# Patient Record
Sex: FEMALE | Race: WHITE | Employment: UNEMPLOYED | URBAN - METROPOLITAN AREA
[De-identification: names, ages, dates, MRNs, and addresses within clinical notes are randomized per-mention and may not be internally consistent; named-entity substitution may affect disease eponyms.]

---

## 2023-02-21 ENCOUNTER — ANESTHESIA EVENT (OUTPATIENT)
Facility: HOSPITAL | Age: 53
End: 2023-02-21
Payer: COMMERCIAL

## 2023-02-23 ENCOUNTER — HOSPITAL ENCOUNTER (OUTPATIENT)
Facility: HOSPITAL | Age: 53
Setting detail: OUTPATIENT SURGERY
Discharge: HOME | End: 2023-02-23
Attending: STUDENT IN AN ORGANIZED HEALTH CARE EDUCATION/TRAINING PROGRAM | Admitting: STUDENT IN AN ORGANIZED HEALTH CARE EDUCATION/TRAINING PROGRAM
Payer: COMMERCIAL

## 2023-02-23 ENCOUNTER — ANESTHESIA (OUTPATIENT)
Facility: HOSPITAL | Age: 53
End: 2023-02-23
Payer: COMMERCIAL

## 2023-02-23 VITALS
TEMPERATURE: 97.8 F | HEIGHT: 68 IN | DIASTOLIC BLOOD PRESSURE: 61 MMHG | HEART RATE: 67 BPM | RESPIRATION RATE: 16 BRPM | BODY MASS INDEX: 17.52 KG/M2 | WEIGHT: 115.6 LBS | OXYGEN SATURATION: 100 % | SYSTOLIC BLOOD PRESSURE: 138 MMHG

## 2023-02-23 LAB
ABO + RH BLD: NORMAL
BLOOD GROUP ANTIBODIES SERPL: NORMAL
SPECIMEN EXP DATE BLD: NORMAL

## 2023-02-23 PROCEDURE — 2720000010 HC SURG SUPPLY STERILE: Performed by: STUDENT IN AN ORGANIZED HEALTH CARE EDUCATION/TRAINING PROGRAM

## 2023-02-23 PROCEDURE — 3600000002 HC SURGERY LEVEL 2 BASE: Performed by: STUDENT IN AN ORGANIZED HEALTH CARE EDUCATION/TRAINING PROGRAM

## 2023-02-23 PROCEDURE — 2580000003 HC RX 258: Performed by: STUDENT IN AN ORGANIZED HEALTH CARE EDUCATION/TRAINING PROGRAM

## 2023-02-23 PROCEDURE — 3700000001 HC ADD 15 MINUTES (ANESTHESIA): Performed by: STUDENT IN AN ORGANIZED HEALTH CARE EDUCATION/TRAINING PROGRAM

## 2023-02-23 PROCEDURE — 6360000002 HC RX W HCPCS: Performed by: NURSE ANESTHETIST, CERTIFIED REGISTERED

## 2023-02-23 PROCEDURE — 2500000003 HC RX 250 WO HCPCS: Performed by: NURSE ANESTHETIST, CERTIFIED REGISTERED

## 2023-02-23 PROCEDURE — 36415 COLL VENOUS BLD VENIPUNCTURE: CPT

## 2023-02-23 PROCEDURE — 88305 TISSUE EXAM BY PATHOLOGIST: CPT

## 2023-02-23 PROCEDURE — 7100000010 HC PHASE II RECOVERY - FIRST 15 MIN: Performed by: STUDENT IN AN ORGANIZED HEALTH CARE EDUCATION/TRAINING PROGRAM

## 2023-02-23 PROCEDURE — 7100000011 HC PHASE II RECOVERY - ADDTL 15 MIN: Performed by: STUDENT IN AN ORGANIZED HEALTH CARE EDUCATION/TRAINING PROGRAM

## 2023-02-23 PROCEDURE — 86900 BLOOD TYPING SEROLOGIC ABO: CPT

## 2023-02-23 PROCEDURE — 7100000000 HC PACU RECOVERY - FIRST 15 MIN: Performed by: STUDENT IN AN ORGANIZED HEALTH CARE EDUCATION/TRAINING PROGRAM

## 2023-02-23 PROCEDURE — 6370000000 HC RX 637 (ALT 250 FOR IP): Performed by: STUDENT IN AN ORGANIZED HEALTH CARE EDUCATION/TRAINING PROGRAM

## 2023-02-23 PROCEDURE — 2709999900 HC NON-CHARGEABLE SUPPLY: Performed by: STUDENT IN AN ORGANIZED HEALTH CARE EDUCATION/TRAINING PROGRAM

## 2023-02-23 PROCEDURE — 3600000012 HC SURGERY LEVEL 2 ADDTL 15MIN: Performed by: STUDENT IN AN ORGANIZED HEALTH CARE EDUCATION/TRAINING PROGRAM

## 2023-02-23 PROCEDURE — 3700000000 HC ANESTHESIA ATTENDED CARE: Performed by: STUDENT IN AN ORGANIZED HEALTH CARE EDUCATION/TRAINING PROGRAM

## 2023-02-23 PROCEDURE — 7100000001 HC PACU RECOVERY - ADDTL 15 MIN: Performed by: STUDENT IN AN ORGANIZED HEALTH CARE EDUCATION/TRAINING PROGRAM

## 2023-02-23 RX ORDER — SODIUM CHLORIDE 0.9 % (FLUSH) 0.9 %
5-40 SYRINGE (ML) INJECTION EVERY 12 HOURS SCHEDULED
Status: DISCONTINUED | OUTPATIENT
Start: 2023-02-23 | End: 2023-02-23 | Stop reason: HOSPADM

## 2023-02-23 RX ORDER — SODIUM CHLORIDE, SODIUM LACTATE, POTASSIUM CHLORIDE, CALCIUM CHLORIDE 600; 310; 30; 20 MG/100ML; MG/100ML; MG/100ML; MG/100ML
INJECTION, SOLUTION INTRAVENOUS CONTINUOUS
Status: DISCONTINUED | OUTPATIENT
Start: 2023-02-23 | End: 2023-02-23 | Stop reason: HOSPADM

## 2023-02-23 RX ORDER — LIDOCAINE HYDROCHLORIDE 20 MG/ML
INJECTION, SOLUTION EPIDURAL; INFILTRATION; INTRACAUDAL; PERINEURAL PRN
Status: DISCONTINUED | OUTPATIENT
Start: 2023-02-23 | End: 2023-02-23 | Stop reason: SDUPTHER

## 2023-02-23 RX ORDER — DEXAMETHASONE SODIUM PHOSPHATE 4 MG/ML
INJECTION, SOLUTION INTRA-ARTICULAR; INTRALESIONAL; INTRAMUSCULAR; INTRAVENOUS; SOFT TISSUE PRN
Status: DISCONTINUED | OUTPATIENT
Start: 2023-02-23 | End: 2023-02-23 | Stop reason: SDUPTHER

## 2023-02-23 RX ORDER — ONDANSETRON 2 MG/ML
4 INJECTION INTRAMUSCULAR; INTRAVENOUS
Status: DISCONTINUED | OUTPATIENT
Start: 2023-02-23 | End: 2023-02-23 | Stop reason: HOSPADM

## 2023-02-23 RX ORDER — HYDROMORPHONE HYDROCHLORIDE 1 MG/ML
0.25 INJECTION, SOLUTION INTRAMUSCULAR; INTRAVENOUS; SUBCUTANEOUS EVERY 5 MIN PRN
Status: DISCONTINUED | OUTPATIENT
Start: 2023-02-23 | End: 2023-02-23 | Stop reason: HOSPADM

## 2023-02-23 RX ORDER — SODIUM CHLORIDE 0.9 % (FLUSH) 0.9 %
5-40 SYRINGE (ML) INJECTION PRN
Status: DISCONTINUED | OUTPATIENT
Start: 2023-02-23 | End: 2023-02-23 | Stop reason: HOSPADM

## 2023-02-23 RX ORDER — FENTANYL CITRATE 50 UG/ML
INJECTION, SOLUTION INTRAMUSCULAR; INTRAVENOUS PRN
Status: DISCONTINUED | OUTPATIENT
Start: 2023-02-23 | End: 2023-02-23 | Stop reason: SDUPTHER

## 2023-02-23 RX ORDER — FENTANYL CITRATE 50 UG/ML
25 INJECTION, SOLUTION INTRAMUSCULAR; INTRAVENOUS EVERY 5 MIN PRN
Status: DISCONTINUED | OUTPATIENT
Start: 2023-02-23 | End: 2023-02-23 | Stop reason: HOSPADM

## 2023-02-23 RX ORDER — OXYCODONE HYDROCHLORIDE 5 MG/1
5 TABLET ORAL
Status: DISCONTINUED | OUTPATIENT
Start: 2023-02-23 | End: 2023-02-23 | Stop reason: HOSPADM

## 2023-02-23 RX ORDER — DIPHENHYDRAMINE HYDROCHLORIDE 50 MG/ML
12.5 INJECTION INTRAMUSCULAR; INTRAVENOUS
Status: DISCONTINUED | OUTPATIENT
Start: 2023-02-23 | End: 2023-02-23 | Stop reason: HOSPADM

## 2023-02-23 RX ORDER — SODIUM CHLORIDE 9 MG/ML
INJECTION, SOLUTION INTRAVENOUS PRN
Status: DISCONTINUED | OUTPATIENT
Start: 2023-02-23 | End: 2023-02-23 | Stop reason: HOSPADM

## 2023-02-23 RX ORDER — DROPERIDOL 2.5 MG/ML
0.62 INJECTION, SOLUTION INTRAMUSCULAR; INTRAVENOUS
Status: DISCONTINUED | OUTPATIENT
Start: 2023-02-23 | End: 2023-02-23 | Stop reason: HOSPADM

## 2023-02-23 RX ORDER — PROPOFOL 10 MG/ML
INJECTION, EMULSION INTRAVENOUS PRN
Status: DISCONTINUED | OUTPATIENT
Start: 2023-02-23 | End: 2023-02-23 | Stop reason: SDUPTHER

## 2023-02-23 RX ORDER — ONDANSETRON 2 MG/ML
INJECTION INTRAMUSCULAR; INTRAVENOUS PRN
Status: DISCONTINUED | OUTPATIENT
Start: 2023-02-23 | End: 2023-02-23 | Stop reason: SDUPTHER

## 2023-02-23 RX ORDER — IPRATROPIUM BROMIDE AND ALBUTEROL SULFATE 2.5; .5 MG/3ML; MG/3ML
1 SOLUTION RESPIRATORY (INHALATION)
Status: DISCONTINUED | OUTPATIENT
Start: 2023-02-23 | End: 2023-02-23 | Stop reason: HOSPADM

## 2023-02-23 RX ORDER — LABETALOL HYDROCHLORIDE 5 MG/ML
10 INJECTION, SOLUTION INTRAVENOUS
Status: DISCONTINUED | OUTPATIENT
Start: 2023-02-23 | End: 2023-02-23 | Stop reason: HOSPADM

## 2023-02-23 RX ORDER — MIDAZOLAM HYDROCHLORIDE 1 MG/ML
INJECTION INTRAMUSCULAR; INTRAVENOUS PRN
Status: DISCONTINUED | OUTPATIENT
Start: 2023-02-23 | End: 2023-02-23 | Stop reason: SDUPTHER

## 2023-02-23 RX ORDER — KETOROLAC TROMETHAMINE 30 MG/ML
INJECTION, SOLUTION INTRAMUSCULAR; INTRAVENOUS PRN
Status: DISCONTINUED | OUTPATIENT
Start: 2023-02-23 | End: 2023-02-23 | Stop reason: SDUPTHER

## 2023-02-23 RX ADMIN — FENTANYL CITRATE 25 MCG: 50 INJECTION, SOLUTION INTRAMUSCULAR; INTRAVENOUS at 14:29

## 2023-02-23 RX ADMIN — KETOROLAC TROMETHAMINE 30 MG: 30 INJECTION, SOLUTION INTRAMUSCULAR; INTRAVENOUS at 14:36

## 2023-02-23 RX ADMIN — FENTANYL CITRATE 25 MCG: 50 INJECTION, SOLUTION INTRAMUSCULAR; INTRAVENOUS at 14:14

## 2023-02-23 RX ADMIN — SODIUM CHLORIDE, SODIUM LACTATE, POTASSIUM CHLORIDE, AND CALCIUM CHLORIDE: 600; 310; 30; 20 INJECTION, SOLUTION INTRAVENOUS at 13:58

## 2023-02-23 RX ADMIN — FENTANYL CITRATE 25 MCG: 50 INJECTION, SOLUTION INTRAMUSCULAR; INTRAVENOUS at 14:16

## 2023-02-23 RX ADMIN — MIDAZOLAM 2 MG: 1 INJECTION INTRAMUSCULAR; INTRAVENOUS at 13:58

## 2023-02-23 RX ADMIN — PROPOFOL 150 MG: 10 INJECTION, EMULSION INTRAVENOUS at 14:05

## 2023-02-23 RX ADMIN — SODIUM CHLORIDE, SODIUM LACTATE, POTASSIUM CHLORIDE, AND CALCIUM CHLORIDE: 600; 310; 30; 20 INJECTION, SOLUTION INTRAVENOUS at 11:50

## 2023-02-23 RX ADMIN — LIDOCAINE HYDROCHLORIDE 100 MG: 20 INJECTION, SOLUTION EPIDURAL; INFILTRATION; INTRACAUDAL; PERINEURAL at 14:05

## 2023-02-23 RX ADMIN — DEXAMETHASONE SODIUM PHOSPHATE 4 MG: 4 INJECTION, SOLUTION INTRAMUSCULAR; INTRAVENOUS at 14:11

## 2023-02-23 RX ADMIN — ONDANSETRON HYDROCHLORIDE 4 MG: 2 INJECTION INTRAMUSCULAR; INTRAVENOUS at 14:11

## 2023-02-23 ASSESSMENT — PAIN - FUNCTIONAL ASSESSMENT: PAIN_FUNCTIONAL_ASSESSMENT: 0-10

## 2023-02-23 NOTE — PROGRESS NOTES
TRANSFER - OUT REPORT:    Verbal report given to Galo Manuel RN on Medardo Navarro  being transferred to Phase II  for routine progression of patient care       Report consisted of patient's Situation, Background, Assessment and   Recommendations(SBAR). Information from the following report(s) Nurse Handoff Report, Adult Overview, Surgery Report, MAR, and Cardiac Rhythm SR  was reviewed with the receiving nurse. [place] Assessment: No data recorded  Lines:   Peripheral IV 02/23/23 Right Forearm (Active)   Site Assessment Clean 02/23/23 1509   Line Status Infusing 02/23/23 1509   Phlebitis Assessment No symptoms 02/23/23 1509   Infiltration Assessment 0 02/23/23 1509   Alcohol Cap Used No 02/23/23 1150   Dressing Status Clean, dry & intact 02/23/23 1509   Dressing Type Transparent 02/23/23 1509        Opportunity for questions and clarification was provided.       Patient transported with:  O2 @ 2lpm and Tech

## 2023-02-23 NOTE — INTERVAL H&P NOTE
Interval History and Physical     I have seen the patient at bedside and reviewed the History and Physical.  There are no pertinent changes. Objective:  /79   Pulse 75   Temp 97.6 °F (36.4 °C) (Temporal)   Resp 16   Ht 5' 8\" (1.727 m)   Wt 115 lb 9.6 oz (52.4 kg)   LMP 11/15/2022   SpO2 100%   BMI 17.58 kg/m²   Gen: A+O, NAD  Cardiac: RRR  Resp: CTAB    Allergies   Allergen Reactions    Erythromycin Anaphylaxis    Penicillins Rash    Sulfa Antibiotics Rash       [unfilled]    No results found for: HEMOGLOBIN, HCT    Assessment/Plan:    In brief, this is a 47 y.o. female with a PMH significant for AUB, unable to perform EMBx in office who presents today for [facility], D&C, possible myomectomy. 1.  All questions answered  2.   Proceed with scheduled surgery    Minus MD Lesley  2/23/2023 1:52 PM

## 2023-02-23 NOTE — PERIOP NOTE
TRANSFER - IN REPORT:    Verbal report received from [address] on [address]  being received from PACU for routine post-op      Report consisted of patient's Situation, Background, Assessment and   Recommendations(SBAR). Information from the following report(s) Nurse Handoff Report was reviewed with the receiving nurse. Opportunity for questions and clarification was provided. Assessment completed upon patient's arrival to unit and care assumed.

## 2023-02-23 NOTE — DISCHARGE INSTRUCTIONS
Hysteroscopy With Dilation and Curettage: What to Expect at [facility]     For a hysteroscopy, your doctor guides a lighted tube through your cervix and into your uterus. This helps the doctor see inside your uterus. For a dilation and curettage (D&C), your doctor uses a curved tool, called a curette, to gently scrape tissue from your uterus. After these procedures, you are likely to have a backache or cramps similar to menstrual cramps. Expect to pass small clots of blood from your vagina for the first few days. You may have light vaginal bleeding for several weeks after the D&C. If the doctor filled your uterus with air, your belly may feel full. You may also have shoulder pain right after the procedure. You will probably be able to go back to most of your normal activities in 1 or 2 days. This care sheet gives you a general idea about how long it will take for you to recover. But each person recovers at a different pace. Follow the steps below to get better as quickly as possible. How can you care for yourself at home? Activity    Rest when you feel tired. You will probably be able to return to work the day after the procedure. But it depends on what was done and the type of work you do. You may have some light vaginal bleeding. Use sanitary pads until you stop bleeding. Using pads makes it easier to monitor your bleeding. Do not rinse your vagina with fluid (douche). Ask your doctor when it is okay for you to have sex. Diet    You can eat your normal diet. If your stomach is upset, try bland, low-fat foods like plain rice, broiled chicken, toast, and yogurt. Drink plenty of fluids (unless your doctor tells you not to). Medicines    Your doctor will tell you if and when you can restart your medicines. You will also get instructions about taking any new medicines.      If you stopped taking aspirin or some other blood thinner, your doctor will tell you when to start taking it again. Be safe with medicines. Read and follow all instructions on the label. If the doctor gave you a prescription medicine for pain, take it as prescribed. If you are not taking a prescription pain medicine, ask your doctor if you can take an over-the-counter medicine. If your doctor prescribed antibiotics, take them as directed. Do not stop taking them just because you feel better. You need to take the full course of antibiotics. Follow-up care is a key part of your treatment and safety. Be sure to make and go to all appointments, and call your doctor if you are having problems. It's also a good idea to know your test results and keep a list of the medicines you take. When should you call for help? Call 911 anytime you think you may need emergency care. For example, call if:    You passed out (lost consciousness). You have chest pain, are short of breath, or cough up blood. Call your doctor now or seek immediate medical care if:    You have pain that does not get better after you take pain medicine. You cannot pass stools or gas. You have bright red vaginal bleeding that soaks one or more pads in an hour, or you have large clots. You have a vaginal discharge that has increased or that smells bad. You are sick to your stomach or cannot drink fluids. You have symptoms of a blood clot in your leg (called a deep vein thrombosis), such as:  Pain in your calf, back of the knee, thigh, or groin. Redness and swelling in your leg. You have signs of infection, such as: Increased pain, swelling, warmth, or redness. A fever. Watch closely for changes in your health, and be sure to contact your doctor if you have any problems. Current as of: February 23, 2022               Content Version: 13.5  © 2006-2022 [employer]. Care instructions adapted under license by [facility] [place] ([facility]).  If you have questions about a medical condition or this instruction, always ask your healthcare professional. [address] any warranty or liability for your use of this information. DISCHARGE SUMMARY from Nurse    PATIENT INSTRUCTIONS:    After general anesthesia or intravenous sedation, for 24 hours or while taking prescription Narcotics:  Limit your activities  Do not drive and operate hazardous machinery  Do not make important personal or business decisions  Do  not drink alcoholic beverages  If you have not urinated within 8 hours after discharge, please contact your surgeon on call. Report the following to your surgeon:  Excessive pain, swelling, redness or odor of or around the surgical area  Temperature over 100.5  Nausea and vomiting lasting longer than 4 hours or if unable to take medications  Any signs of decreased circulation or nerve impairment to extremity: change in color, persistent  numbness, tingling, coldness or increase pain  Any questions    What to do at Home:  Recommended activity: activity as tolerated. If you experience any of the following symptoms as stated above, please follow up with Dr. Cely Butcher. *  Please give a list of your current medications to your Primary Care Provider. *  Please update this list whenever your medications are discontinued, doses are      changed, or new medications (including over-the-counter products) are added. *  Please carry medication information at all times in case of emergency situations. These are general instructions for a healthy lifestyle:    No smoking/ No tobacco products/ Avoid exposure to second hand smoke  Surgeon General's Warning:  Quitting smoking now greatly reduces serious risk to your health.     Obesity, smoking, and sedentary lifestyle greatly increases your risk for illness    A healthy diet, regular physical exercise & weight monitoring are important for maintaining a healthy lifestyle    You may be retaining fluid if you have a history of heart failure or if you experience any of the following symptoms:  Weight gain of 3 pounds or more overnight or 5 pounds in a week, increased swelling in our hands or feet or shortness of breath while lying flat in bed. Please call your doctor as soon as you notice any of these symptoms; do not wait until your next office visit. The discharge information has been reviewed with the patient and caregiver. The patient and caregiver verbalized understanding. Discharge medications reviewed with the patient and caregiver and appropriate educational materials and side effects teaching were provided. Patient armband removed and shredded.    ___________________________________________________________________________________________________________________________________

## 2023-02-23 NOTE — POST SEDATION
Operative Report    Patient: Emmanuel Solorzano MRN: [id number]  SSN: [id number]    YOB: 1970  Age: 47 y.o. Sex: female       Date of Surgery: 2/23/2023    Preoperative Diagnosis: Abnormal uterine bleeding, suspected uterine fibroid    Postoperative Diagnosis: Same    Surgeon(s) and Role:  Christa Muhammad MD    Assistants:  None    Anesthesia: General     Procedure: Procedure(s): HYSTEROSCOPY, DILATATION AND CURETTAGE, MYOMECTOMY with Symphion operative hysteroscope    Findings:  1. EUA showed a uterus normal size uterus without any palpable adnexal masses. Normal external genitalia (with exception of area of 3cm area of well defined hypopigmented skin at perineum), normal vaginal tissue, normal appearing cervix  2. Hysteroscopic findings   - Anterior endometrium with 2-3cm FIGO type 2 fibroid at anterior R endometrium  - Posterior endometrium wnl   - Left cornua wnl   - Right cornua wnl    Procedure in Detail:   The patient was taken to the operating room where general anesthesia was administered and found to be adequate. She was placed in [address] [place] [address], prepared and draped in the usual sterile fashion. A time out was performed, confirming the patient and procedure. A speculum was placed and the anterior lip of the cervix grasped with a single tooth tenaculum. Hysteroscopic entry was obtained by dilating the cervix to #8 Hegar. The [employer] operative hysteroscope was easily inserted. The above findings were noted on exam.     The identified fibroid was excised under direct vision. Small bleeding area was coagulated. The cavity was inspected and no evidence of perforation was noted. Instruments were withdrawn from the uterus. Hemostasis at tenaculum site was ensured with pressure and silver nitrate. The patient tolerated the procedure well and was transferred to the recovery room in good condition.      Fluid deficit 350 cc NS    Estimated Blood Loss:  Minimal <5cc    Implants:   * No implants in log *            Specimens:   ID Type Source Tests Collected by Time Destination   A : uterine fibroid Tissue Uterus SURGICAL PATHOLOGY Familia Alonso MD 2023 1425    B : endometrial currettings Tissue Endometrium SURGICAL PATHOLOGY Familia Alonso MD 2023 1426        Drains: None                Complications: None    Counts: Sponge and needle counts were correct times two.     Signed By:  Familia Alonso MD     2023      Event Time In   In [facility]   In Preprocedure 1122   Physician Available    Anesthesia Available    Out of Preprocedure    Back to Preprocedure    Preprocedure Complete    In Holding Area 1205   Out of Holding Area    Anesthesia Start 1358   Perfusion Start    Setup Start    Setup Complete    In Room 1400   Induction 1405   Anesthesia Ready    Procedure Start 0851   Procedure Closing 2740   Procedure Finish 7113   Out of Room 1442   In PACU 1441   Perfusion Stop    Anesthesia Stop 026 848 14 90   PACU Care Complete    Out of PACU    Return to PACU    Out of PACU (2nd Time)    In Phase II    Phase II Care Complete    Out of Phase II    Return to Phase II    Out of Phase II (2nd Time)    Returned to OR    End of Periop Care    Epidural to

## 2023-02-23 NOTE — ANESTHESIA POSTPROCEDURE EVALUATION
Department of Anesthesiology  Postprocedure Note    Patient: Marisel Cerda  MRN: [id number]  YOB: 1970  Date of evaluation: 2/23/2023      Procedure Summary     Date: 02/23/23 Room / Location: [facility] 03 / [facility] OR    Anesthesia Start: 0535 Anesthesia Stop: 1903    Procedure: HYSTEROSCOPY,DILATATION AND CURETTAGE, MYOMECTOMY (Uterus) Diagnosis:       Abnormal uterine bleeding      (Abnormal uterine bleeding [N93.9])    Surgeons: Maine Winn MD Responsible Provider: Nba Camara MD    Anesthesia Type: General ASA Status: 2          Anesthesia Type: General    Jinny Phase I: Jinny Score: 9    Jinny Phase II: Jinny Score: 10      Anesthesia Post Evaluation    Patient location during evaluation: PACU  Patient participation: complete - patient participated  Level of consciousness: awake and alert  Pain score: 0  Airway patency: patent  Nausea & Vomiting: no vomiting and no nausea  Complications: no  Cardiovascular status: hemodynamically stable  Respiratory status: acceptable  Hydration status: euvolemic  Multimodal analgesia pain management approach

## 2023-02-23 NOTE — PERIOP NOTE
Reviewed PTA medication list with patient/caregiver and patient/caregiver denies any additional medications. Patient admits to having a responsible adult care for them at home for at least 24 hours after surgery. Patient encouraged to use gown warming system and informed that using said warming gown to regulate body temperature prior to a procedure has been shown to help reduce the risks of blood clots and infection. Patient's pharmacy of choice verified and documented in PTA medication section. Dual skin assessment & fall risk band verification completed with Renita GUZMAN RN.

## 2023-02-23 NOTE — ANESTHESIA PRE PROCEDURE
Department of Anesthesiology  Preprocedure Note       Name:  Virginia Alfaro   Age:  47 y.o.  :  1970                                          MRN:  [id number]         Date:  2023      Surgeon: Minerva Gamez):  Meseret Hernández MD    Procedure: Procedure(s): HYSTEROSCOPY,DILATATION AND CURETTAGE,POSSIBLE MYOMECTOMY    Medications prior to admission:   Prior to Admission medications    Medication Sig Start Date End Date Taking? Authorizing Provider   levothyroxine (SYNTHROID) 100 MCG tablet Take 88 mcg by mouth Daily Indications: hypothyroidism 22   Historical Provider, MD   LORazepam (ATIVAN) 0.5 MG tablet Take 0.5 mg by mouth as needed. Indications: sometimes 2x/week - sleep aid 22   Historical Provider, MD   spironolactone (ALDACTONE) 100 MG tablet Take 100 mg by mouth daily Indications: heavy period- \"femenine hormones\" 20   Historical Provider, MD   Probiotic Product (PROBIOTIC DAILY PO) Take by mouth Daily Indications: Allign Probiotic    Historical Provider, MD   Multiple Vitamins-Minerals (HAIR SKIN NAILS PO) Take by mouth    Historical Provider, MD       Current medications:    Current Facility-Administered Medications   Medication Dose Route Frequency Provider Last Rate Last Admin    lactated ringers IV soln infusion   IntraVENous Continuous Meseret Hernández  mL/hr at 23 1150 New Bag at 23 1150       Allergies: Allergies   Allergen Reactions    Erythromycin Anaphylaxis    Penicillins Rash    Sulfa Antibiotics Rash       Problem List:  There is no problem list on file for this patient. Past Medical History:        Diagnosis Date    Abnormal uterine bleeding 2023    Exercise tolerance finding 2023    walk/bike everyday. Pt denies SOB/chest pain with going up/down 1-2 flights of stairs.     Heart burn 2022    seen at ER-symptoms minic chest pain, EKG & CXR-negative    History of COVID-19 2020    Sinus infection 2022    chronic had since ~4 months ago, on 30 days abx    Thyroid disease     hypo, only med       Past Surgical History:        Procedure Laterality Date    APPENDECTOMY  1990    COLONOSCOPY      LARYNGOSCOPY  06/15/2018    x3, microlaryngoscopy direct with dilation- \"help with swallowing and breathing\"       Social History:    Social History     Tobacco Use    Smoking status: Never    Smokeless tobacco: Never   Substance Use Topics    Alcohol use: Yes     Alcohol/week: 4.0 standard drinks     Types: 3 Glasses of wine, 1 Cans of beer per week     Comment: occ beer/glass of wine                                Counseling given: Not Answered      Vital Signs (Current):   Vitals:    02/23/23 1129   BP: 136/79   Pulse: 75   Resp: 16   Temp: 97.6 °F (36.4 °C)   TempSrc: Temporal   SpO2: 100%   Weight: 115 lb 9.6 oz (52.4 kg)   Height: 5' 8\" (1.727 m)                                              BP Readings from Last 3 Encounters:   02/23/23 136/79       NPO Status: Time of last liquid consumption: 2200                        Time of last solid consumption: 2200                        Date of last liquid consumption: 02/22/23                        Date of last solid food consumption: 02/22/23    BMI:   Wt Readings from Last 3 Encounters:   02/23/23 115 lb 9.6 oz (52.4 kg)   02/20/23 114 lb (51.7 kg)     Body mass index is 17.58 kg/m². CBC: No results found for: WBC, RBC, HGB, HCT, MCV, RDW, PLT    CMP: No results found for: NA, K, CL, CO2, BUN, CREATININE, GFRAA, AGRATIO, LABGLOM, GLUCOSE, GLU, PROT, CALCIUM, BILITOT, ALKPHOS, AST, ALT    POC Tests: No results for input(s): POCGLU, POCNA, POCK, POCCL, POCBUN, POCHEMO, POCHCT in the last 72 hours.     Coags: No results found for: PROTIME, INR, APTT    HCG (If Applicable): No results found for: PREGTESTUR, PREGSERUM, HCG, HCGQUANT     ABGs: No results found for: PHART, PO2ART, MBV1FUZ, MOB0VNV, BEART, [id number]     Type & Screen (If Applicable):  No results found for: LABABO, [address]    Drug/Infectious Status (If Applicable):  No results found for: HIV, HEPCAB    COVID-19 Screening (If Applicable): No results found for: COVID19        Anesthesia Evaluation  Patient summary reviewed and Nursing notes reviewed  Airway: Mallampati: I  TM distance: >3 FB   Neck ROM: full     Dental: normal exam         Pulmonary:Negative Pulmonary ROS                              Cardiovascular:Negative CV ROS  Exercise tolerance: good (>4 METS),                     Neuro/Psych:   Negative Neuro/Psych ROS              GI/Hepatic/Renal: Neg GI/Hepatic/Renal ROS            Endo/Other:    (+) hypothyroidism::., .                 Abdominal:             Vascular: negative vascular ROS. Other Findings:           Anesthesia Plan      general     ASA 2       Induction: intravenous. Anesthetic plan and risks discussed with patient.                         Melanie Gandhi MD   2/23/2023

## 2023-02-23 NOTE — OP NOTE
Operative Report    Patient: Taran Conti MRN: [id number]  SSN: [id number]    YOB: 1970  Age: 47 y.o. Sex: female       Date of Surgery: 2/23/2023    Preoperative Diagnosis: Abnormal uterine bleeding, suspected uterine fibroid    Postoperative Diagnosis: Same    Surgeon(s) and Role:  Alex Coleman MD    Assistants:  None    Anesthesia: General     Procedure: Procedure(s): HYSTEROSCOPY, DILATATION AND CURETTAGE, MYOMECTOMY with Symphion operative hysteroscope    Findings:  1. EUA showed a uterus normal size uterus without any palpable adnexal masses. Normal external genitalia (with exception of area of 3cm area of well defined hypopigmented skin at perineum), normal vaginal tissue, normal appearing cervix  2. Hysteroscopic findings   - Anterior endometrium with 2-3cm FIGO type 2 fibroid at anterior R endometrium  - Posterior endometrium wnl   - Left cornua wnl   - Right cornua wnl    Procedure in Detail:   The patient was taken to the operating room where general anesthesia was administered and found to be adequate. She was placed in [address] [place] [address], prepared and draped in the usual sterile fashion. A time out was performed, confirming the patient and procedure. A speculum was placed and the anterior lip of the cervix grasped with a single tooth tenaculum. Hysteroscopic entry was obtained by dilating the cervix to #8 Hegar. The [employer] operative hysteroscope was easily inserted. The above findings were noted on exam.     The identified fibroid was excised under direct vision. Small bleeding area was coagulated. The cavity was inspected and no evidence of perforation was noted. Instruments were withdrawn from the uterus. Hemostasis at tenaculum site was ensured with pressure and silver nitrate. The patient tolerated the procedure well and was transferred to the recovery room in good condition.      Fluid deficit 350 cc NS    Estimated Blood Loss:  Minimal <5cc    Implants:   * No implants in log *            Specimens:   ID Type Source Tests Collected by Time Destination   A : uterine fibroid Tissue Uterus SURGICAL PATHOLOGY Lila Whitehead MD 2023 1425    B : endometrial currettings Tissue Endometrium SURGICAL PATHOLOGY Lila Whitehead MD 2023 1426        Drains: None                Complications: None    Counts: Sponge and needle counts were correct times two.     Signed By:  Lila Whitehead MD     2023      Event Time In   In [facility]   In Preprocedure 1122   Physician Available    Anesthesia Available    Out of Preprocedure    Back to Preprocedure    Preprocedure Complete    In Holding Area 1205   Out of Holding Area    Anesthesia Start 1358   Perfusion Start    Setup Start    Setup Complete    In Room 1400   Induction 1405   Anesthesia Ready    Procedure Start 7317   Procedure Closing 6041   Procedure Finish 9973   Out of Room 1442   In PACU 1441   Perfusion Stop    Anesthesia Stop 12   PACU Care Complete    Out of PACU    Return to PACU    Out of PACU (2nd Time)    In Phase II    Phase II Care Complete    Out of Phase II    Return to Phase II    Out of Phase II (2nd Time)    Returned to OR    End of Periop Care    Epidural to

## 2023-02-23 NOTE — PERIOP NOTE
Discharge instructions completed. Opportunity for questions. Encouraged PO fluids. Armband shredded. Acacia Balling

## (undated) DEVICE — SUTURE VCRL + SZ 2-0 L27IN ABSRB WHT SH 1/2 CIR TAPERCUT VCP417H

## (undated) DEVICE — D&C HYSTEROSCOPY: Brand: MEDLINE INDUSTRIES, INC.

## (undated) DEVICE — 3M™ BAIR PAWS FLEX™ WARMING GOWN, SMALL, 20 PER CASE 81103: Brand: BAIR PAWS™

## (undated) DEVICE — GARMENT,MEDLINE,DVT,INT,CALF,MED, GEN2: Brand: MEDLINE

## (undated) DEVICE — GLOVE SURG SZ 65 THK91MIL LTX FREE SYN POLYISOPRENE

## (undated) DEVICE — SOLUTION IRRIG 3000ML 0.9% SOD CHL USP UROMATIC PLAS CONT

## (undated) DEVICE — TISSUE REMOVAL SYSTEM FLUID MANAGEMENT ACCESSORIES: Brand: SYMPHION

## (undated) DEVICE — TISSUE REMOVAL SYSTEM RESECTING DEVICE: Brand: SYMPHION